# Patient Record
Sex: FEMALE | Race: WHITE | Employment: FULL TIME | URBAN - NONMETROPOLITAN AREA
[De-identification: names, ages, dates, MRNs, and addresses within clinical notes are randomized per-mention and may not be internally consistent; named-entity substitution may affect disease eponyms.]

---

## 2018-01-09 ENCOUNTER — HOSPITAL ENCOUNTER (EMERGENCY)
Age: 18
Discharge: HOME | End: 2018-01-09
Attending: FAMILY MEDICINE
Payer: COMMERCIAL

## 2018-01-09 VITALS
WEIGHT: 135 LBS | BODY MASS INDEX: 23.05 KG/M2 | RESPIRATION RATE: 18 BRPM | DIASTOLIC BLOOD PRESSURE: 76 MMHG | HEART RATE: 107 BPM | HEIGHT: 64 IN | TEMPERATURE: 97.3 F | OXYGEN SATURATION: 98 % | SYSTOLIC BLOOD PRESSURE: 137 MMHG

## 2018-01-09 DIAGNOSIS — R11.2 NAUSEA VOMITING AND DIARRHEA: ICD-10-CM

## 2018-01-09 DIAGNOSIS — R10.84 GENERALIZED ABDOMINAL PAIN: Primary | ICD-10-CM

## 2018-01-09 DIAGNOSIS — R19.7 NAUSEA VOMITING AND DIARRHEA: ICD-10-CM

## 2018-01-09 LAB
BACTERIA: ABNORMAL /HPF
BILIRUBIN URINE: NEGATIVE
BLOOD, URINE: NEGATIVE
CASTS 2: ABNORMAL /LPF
CASTS UA: ABNORMAL /LPF
CHARACTER, URINE: ABNORMAL
COLOR: YELLOW
CRYSTALS, UA: ABNORMAL
EPITHELIAL CELLS, UA: ABNORMAL /HPF
GLUCOSE URINE: NEGATIVE MG/DL
KETONES, URINE: NEGATIVE
LEUKOCYTE ESTERASE, URINE: ABNORMAL
MISCELLANEOUS 2: ABNORMAL
NITRITE, URINE: NEGATIVE
PH UA: 5.5
PREGNANCY, URINE: NEGATIVE
PROTEIN UA: NEGATIVE
RBC URINE: ABNORMAL /HPF
RENAL EPITHELIAL, UA: ABNORMAL
SPECIFIC GRAVITY, URINE: 1.02 (ref 1–1.03)
UROBILINOGEN, URINE: 0.2 EU/DL
WBC UA: ABNORMAL /HPF
YEAST: ABNORMAL

## 2018-01-09 PROCEDURE — 81001 URINALYSIS AUTO W/SCOPE: CPT

## 2018-01-09 PROCEDURE — 6360000002 HC RX W HCPCS: Performed by: FAMILY MEDICINE

## 2018-01-09 PROCEDURE — 99284 EMERGENCY DEPT VISIT MOD MDM: CPT

## 2018-01-09 PROCEDURE — 87086 URINE CULTURE/COLONY COUNT: CPT

## 2018-01-09 PROCEDURE — 81025 URINE PREGNANCY TEST: CPT

## 2018-01-09 PROCEDURE — 96372 THER/PROPH/DIAG INJ SC/IM: CPT

## 2018-01-09 RX ORDER — ONDANSETRON 4 MG/1
4 TABLET, ORALLY DISINTEGRATING ORAL ONCE
Status: COMPLETED | OUTPATIENT
Start: 2018-01-09 | End: 2018-01-09

## 2018-01-09 RX ORDER — ONDANSETRON 4 MG/1
4 TABLET, ORALLY DISINTEGRATING ORAL EVERY 8 HOURS PRN
Qty: 12 TABLET | Refills: 0 | Status: SHIPPED | OUTPATIENT
Start: 2018-01-09 | End: 2018-05-30 | Stop reason: ALTCHOICE

## 2018-01-09 RX ORDER — KETOROLAC TROMETHAMINE 30 MG/ML
60 INJECTION, SOLUTION INTRAMUSCULAR; INTRAVENOUS ONCE
Status: COMPLETED | OUTPATIENT
Start: 2018-01-09 | End: 2018-01-09

## 2018-01-09 RX ADMIN — KETOROLAC TROMETHAMINE 60 MG: 30 INJECTION, SOLUTION INTRAMUSCULAR at 14:05

## 2018-01-09 RX ADMIN — ONDANSETRON 4 MG: 4 TABLET, ORALLY DISINTEGRATING ORAL at 14:05

## 2018-01-09 ASSESSMENT — ENCOUNTER SYMPTOMS
SHORTNESS OF BREATH: 0
ABDOMINAL PAIN: 1
DIARRHEA: 1
VOMITING: 1
SORE THROAT: 0
CHEST TIGHTNESS: 0
WHEEZING: 0
COLOR CHANGE: 0
SINUS PRESSURE: 0
NAUSEA: 1
BACK PAIN: 1

## 2018-01-09 ASSESSMENT — PAIN SCALES - GENERAL: PAINLEVEL_OUTOF10: 8

## 2018-01-09 ASSESSMENT — PAIN DESCRIPTION - FREQUENCY: FREQUENCY: CONTINUOUS

## 2018-01-09 ASSESSMENT — PAIN DESCRIPTION - LOCATION: LOCATION: ABDOMEN;FLANK

## 2018-01-09 ASSESSMENT — PAIN DESCRIPTION - ORIENTATION: ORIENTATION: LEFT;RIGHT

## 2018-01-09 ASSESSMENT — PAIN DESCRIPTION - PAIN TYPE: TYPE: ACUTE PAIN

## 2018-01-09 ASSESSMENT — PAIN DESCRIPTION - DESCRIPTORS: DESCRIPTORS: SHARP

## 2018-01-09 NOTE — LETTER
[address] [facility] [address] [address] EMERGENCY DEPT  [address]  [address] [place] [address] Drive [address]  Phone: [phone]             January 9, 2018    Patient: Veronique Ames   YOB: 2000   Date of Visit: 1/9/2018       To Whom It May Concern:    Veronique Ames was seen and treated in our emergency department on 1/9/2018. She may return to school on 1/10/18.       Sincerely,   Lizzei Granda RN            Signature:__________________________________

## 2018-01-11 LAB — URINE CULTURE REFLEX: NORMAL

## 2018-04-13 ENCOUNTER — NURSE TRIAGE (OUTPATIENT)
Dept: ADMINISTRATIVE | Age: 18
End: 2018-04-13

## 2018-05-30 ENCOUNTER — HOSPITAL ENCOUNTER (EMERGENCY)
Age: 18
Discharge: HOME | End: 2018-05-30
Payer: COMMERCIAL

## 2018-05-30 VITALS
BODY MASS INDEX: 23.9 KG/M2 | WEIGHT: 140 LBS | SYSTOLIC BLOOD PRESSURE: 129 MMHG | RESPIRATION RATE: 18 BRPM | DIASTOLIC BLOOD PRESSURE: 73 MMHG | TEMPERATURE: 98.9 F | HEART RATE: 100 BPM | HEIGHT: 64 IN | OXYGEN SATURATION: 98 %

## 2018-05-30 DIAGNOSIS — J00 ACUTE NASOPHARYNGITIS: Primary | ICD-10-CM

## 2018-05-30 PROCEDURE — 99202 OFFICE O/P NEW SF 15 MIN: CPT | Performed by: NURSE PRACTITIONER

## 2018-05-30 PROCEDURE — 99214 OFFICE O/P EST MOD 30 MIN: CPT

## 2018-05-30 RX ORDER — PSEUDOEPHEDRINE HYDROCHLORIDE 30 MG/1
30 TABLET ORAL EVERY 6 HOURS PRN
Qty: 20 TABLET | Refills: 1 | Status: SHIPPED | OUTPATIENT
Start: 2018-05-30 | End: 2018-06-06

## 2018-05-30 RX ORDER — ALBUTEROL SULFATE 90 UG/1
2 AEROSOL, METERED RESPIRATORY (INHALATION) 2 TIMES DAILY
Qty: 1 INHALER | Refills: 0 | Status: SHIPPED | OUTPATIENT
Start: 2018-05-30 | End: 2018-06-17

## 2018-05-30 RX ORDER — PREDNISONE 1 MG/1
5 TABLET ORAL 2 TIMES DAILY
Qty: 10 TABLET | Refills: 0 | Status: SHIPPED | OUTPATIENT
Start: 2018-05-30 | End: 2018-06-04

## 2018-05-30 RX ORDER — AZELASTINE 1 MG/ML
1 SPRAY, METERED NASAL 2 TIMES DAILY
Qty: 1 BOTTLE | Refills: 0 | Status: SHIPPED | OUTPATIENT
Start: 2018-05-30 | End: 2018-06-17

## 2018-05-30 ASSESSMENT — PAIN DESCRIPTION - FREQUENCY: FREQUENCY: CONTINUOUS

## 2018-05-30 ASSESSMENT — ENCOUNTER SYMPTOMS
CHEST TIGHTNESS: 0
RHINORRHEA: 1
VOMITING: 0
APNEA: 0
NAUSEA: 1
SORE THROAT: 1
ABDOMINAL PAIN: 0
SWOLLEN GLANDS: 0
STRIDOR: 0
DIARRHEA: 0
COUGH: 1
HOARSE VOICE: 0
SINUS PRESSURE: 1
SNORING: 0
CHOKING: 0
SHORTNESS OF BREATH: 0
WHEEZING: 0

## 2018-05-30 ASSESSMENT — PAIN DESCRIPTION - PAIN TYPE: TYPE: ACUTE PAIN

## 2018-05-30 ASSESSMENT — PAIN DESCRIPTION - LOCATION: LOCATION: HEAD;EAR

## 2018-05-30 ASSESSMENT — PAIN SCALES - GENERAL: PAINLEVEL_OUTOF10: 4

## 2018-05-30 ASSESSMENT — PAIN DESCRIPTION - DESCRIPTORS: DESCRIPTORS: PRESSURE

## 2018-06-17 ENCOUNTER — HOSPITAL ENCOUNTER (EMERGENCY)
Age: 18
Discharge: HOME | End: 2018-06-17
Payer: COMMERCIAL

## 2018-06-17 VITALS
DIASTOLIC BLOOD PRESSURE: 76 MMHG | SYSTOLIC BLOOD PRESSURE: 128 MMHG | HEIGHT: 64 IN | TEMPERATURE: 98.1 F | RESPIRATION RATE: 18 BRPM | OXYGEN SATURATION: 97 % | WEIGHT: 140 LBS | BODY MASS INDEX: 23.9 KG/M2 | HEART RATE: 118 BPM

## 2018-06-17 DIAGNOSIS — J06.9 ACUTE UPPER RESPIRATORY INFECTION: Primary | ICD-10-CM

## 2018-06-17 PROCEDURE — 99213 OFFICE O/P EST LOW 20 MIN: CPT | Performed by: NURSE PRACTITIONER

## 2018-06-17 PROCEDURE — 99212 OFFICE O/P EST SF 10 MIN: CPT

## 2018-06-17 RX ORDER — AZITHROMYCIN 250 MG/1
TABLET, FILM COATED ORAL
Qty: 6 TABLET | Refills: 0 | Status: SHIPPED | OUTPATIENT
Start: 2018-06-17 | End: 2018-06-17

## 2018-06-17 RX ORDER — PREDNISONE 20 MG/1
40 TABLET ORAL DAILY
Qty: 10 TABLET | Refills: 0 | Status: SHIPPED | OUTPATIENT
Start: 2018-06-17 | End: 2018-06-22

## 2018-06-17 RX ORDER — AZITHROMYCIN 250 MG/1
TABLET, FILM COATED ORAL
Qty: 6 TABLET | Refills: 0 | Status: SHIPPED | OUTPATIENT
Start: 2018-06-17 | End: 2018-12-06 | Stop reason: ALTCHOICE

## 2018-06-17 ASSESSMENT — ENCOUNTER SYMPTOMS
WHEEZING: 1
COUGH: 1
SHORTNESS OF BREATH: 0
SORE THROAT: 1
COLOR CHANGE: 0
TROUBLE SWALLOWING: 0
DIARRHEA: 0
RHINORRHEA: 0
SINUS PAIN: 0
NAUSEA: 0
SINUS PRESSURE: 1
CHEST TIGHTNESS: 1
EYE ITCHING: 0
EYE DISCHARGE: 0
VOMITING: 0

## 2018-06-18 ENCOUNTER — NURSE TRIAGE (OUTPATIENT)
Dept: ADMINISTRATIVE | Age: 18
End: 2018-06-18

## 2018-12-06 ENCOUNTER — HOSPITAL ENCOUNTER (EMERGENCY)
Age: 18
Discharge: HOME | End: 2018-12-06
Attending: EMERGENCY MEDICINE

## 2018-12-06 VITALS
HEIGHT: 64 IN | SYSTOLIC BLOOD PRESSURE: 110 MMHG | DIASTOLIC BLOOD PRESSURE: 63 MMHG | WEIGHT: 140 LBS | RESPIRATION RATE: 16 BRPM | HEART RATE: 88 BPM | OXYGEN SATURATION: 96 % | TEMPERATURE: 98.6 F | BODY MASS INDEX: 23.9 KG/M2

## 2018-12-06 DIAGNOSIS — Z20.2 POSSIBLE EXPOSURE TO STD: ICD-10-CM

## 2018-12-06 DIAGNOSIS — N30.01 ACUTE CYSTITIS WITH HEMATURIA: Primary | ICD-10-CM

## 2018-12-06 LAB
BILIRUBIN URINE: NEGATIVE
BLOOD, URINE: ABNORMAL
CHARACTER, URINE: ABNORMAL
CHLAMYDIA TRACHOMATIS BY RT-PCR: NOT DETECTED
COLOR: YELLOW
CT/NG SOURCE: NORMAL
GLUCOSE, URINE: NEGATIVE MG/DL
KETONES, URINE: NEGATIVE
LEUKOCYTES, UA: ABNORMAL
NEISSERIA GONORRHOEAE BY RT-PCR: NOT DETECTED
NITRATE, UA: NEGATIVE
PH UA: 7.5 (ref 5–9)
PROTEIN UA: ABNORMAL MG/DL
REFLEX TO URINE C & S: ABNORMAL
SPECIFIC GRAVITY UA: 1.02 (ref 1–1.03)
UROBILINOGEN, URINE: 0.2 EU/DL (ref 0–1)

## 2018-12-06 PROCEDURE — 99213 OFFICE O/P EST LOW 20 MIN: CPT

## 2018-12-06 PROCEDURE — 99213 OFFICE O/P EST LOW 20 MIN: CPT | Performed by: EMERGENCY MEDICINE

## 2018-12-06 PROCEDURE — 87491 CHLMYD TRACH DNA AMP PROBE: CPT

## 2018-12-06 PROCEDURE — 87591 N.GONORRHOEAE DNA AMP PROB: CPT

## 2018-12-06 PROCEDURE — 81003 URINALYSIS AUTO W/O SCOPE: CPT

## 2018-12-06 RX ORDER — DOXYCYCLINE HYCLATE 100 MG
100 TABLET ORAL 2 TIMES DAILY
Qty: 14 TABLET | Refills: 0 | Status: SHIPPED | OUTPATIENT
Start: 2018-12-06 | End: 2018-12-13

## 2018-12-06 RX ORDER — PHENAZOPYRIDINE HYDROCHLORIDE 200 MG/1
200 TABLET, FILM COATED ORAL 3 TIMES DAILY PRN
Qty: 9 TABLET | Refills: 0 | Status: SHIPPED | OUTPATIENT
Start: 2018-12-06 | End: 2019-01-24 | Stop reason: ALTCHOICE

## 2018-12-06 ASSESSMENT — ENCOUNTER SYMPTOMS
TROUBLE SWALLOWING: 0
CHOKING: 0
NAUSEA: 0
SHORTNESS OF BREATH: 0
CONSTIPATION: 0
WHEEZING: 0
VOMITING: 0
DIARRHEA: 0
SORE THROAT: 0
SINUS PRESSURE: 0
BACK PAIN: 0
COUGH: 0
EYE REDNESS: 0
FACIAL SWELLING: 0
EYE DISCHARGE: 0
VOICE CHANGE: 0
BLOOD IN STOOL: 0
STRIDOR: 0
ABDOMINAL PAIN: 0
EYE PAIN: 0

## 2018-12-06 NOTE — ED PROVIDER NOTES
heard.  Pulmonary/Chest: Effort normal and breath sounds normal. No stridor. No respiratory distress. She has no wheezes. She has no rales. She exhibits no tenderness. No cough, lungs clear   Abdominal: Soft. Bowel sounds are normal. She exhibits no distension and no mass. There is no tenderness. There is no rebound and no guarding. Soft nontender   Musculoskeletal: Normal range of motion. She exhibits no edema or tenderness. Right lower leg: Normal.        Left lower leg: Normal.   Lymphadenopathy:     She has cervical adenopathy. Right cervical: Superficial cervical adenopathy present. No deep cervical and no posterior cervical adenopathy present. Left cervical: Superficial cervical adenopathy present. No deep cervical and no posterior cervical adenopathy present. Neurological: She is alert and oriented to person, place, and time. She has normal reflexes. She displays normal reflexes. No cranial nerve deficit. She exhibits normal muscle tone. Coordination normal.   Appropriate, no focal findings   Skin: Skin is warm and dry. No rash noted. She is not diaphoretic. No erythema. No rash   Psychiatric: She has a normal mood and affect. Her behavior is normal. Judgment and thought content normal.   Nursing note and vitals reviewed.       DIAGNOSTIC RESULTS   Labs:  Results for orders placed or performed during the hospital encounter of 12/06/18   UA without Microscopic Reflex C&S   Result Value Ref Range    Glucose, Urine Negative NEGATIVE mg/dl    Bilirubin Urine Negative NEGATIVE    Ketones, Urine Negative NEGATIVE    Specific Gravity, UA 1.020 1.002 - 1.03    Blood, Urine Moderate (A) NEGATIVE    pH, UA 7.50 5.0 - 9.0    Protein, UA Trace (A) NEGATIVE mg/dl    Urobilinogen, Urine 0.20 0.0 - 1.0 eu/dl    Nitrate, UA Negative NEGATIVE    LEUKOCYTES, UA Trace (A) NEGATIVE    Color, UA Yellow STRAW-YELL    Character, Urine Slightly Cloudy CLEAR-SL C    REFLEX TO URINE C & S NOT INDICATED

## 2019-01-24 ENCOUNTER — HOSPITAL ENCOUNTER (EMERGENCY)
Age: 19
Discharge: HOME | End: 2019-01-24
Payer: COMMERCIAL

## 2019-01-24 VITALS
BODY MASS INDEX: 23.32 KG/M2 | RESPIRATION RATE: 18 BRPM | TEMPERATURE: 98.6 F | SYSTOLIC BLOOD PRESSURE: 121 MMHG | OXYGEN SATURATION: 96 % | HEIGHT: 65 IN | HEART RATE: 106 BPM | WEIGHT: 140 LBS | DIASTOLIC BLOOD PRESSURE: 61 MMHG

## 2019-01-24 DIAGNOSIS — J06.9 VIRAL URI WITH COUGH: Primary | ICD-10-CM

## 2019-01-24 PROCEDURE — 99213 OFFICE O/P EST LOW 20 MIN: CPT | Performed by: NURSE PRACTITIONER

## 2019-01-24 PROCEDURE — 99215 OFFICE O/P EST HI 40 MIN: CPT

## 2019-01-24 RX ORDER — BENZONATATE 100 MG/1
100 CAPSULE ORAL 3 TIMES DAILY PRN
Qty: 21 CAPSULE | Refills: 0 | Status: SHIPPED | OUTPATIENT
Start: 2019-01-24 | End: 2019-01-31

## 2019-01-24 RX ORDER — PREDNISONE 20 MG/1
40 TABLET ORAL DAILY
Qty: 14 TABLET | Refills: 0 | Status: SHIPPED | OUTPATIENT
Start: 2019-01-24 | End: 2019-01-31

## 2019-01-24 ASSESSMENT — ENCOUNTER SYMPTOMS
COUGH: 1
CHEST TIGHTNESS: 0
NAUSEA: 0
SHORTNESS OF BREATH: 0
WHEEZING: 0
SINUS PRESSURE: 0
ABDOMINAL PAIN: 0
VOMITING: 0
DIARRHEA: 0

## 2019-04-23 ENCOUNTER — HOSPITAL ENCOUNTER (OUTPATIENT)
Age: 19
Discharge: HOME | End: 2019-04-23

## 2019-04-26 LAB — VZV IGG SER QL IA: 2.49

## 2019-07-11 ENCOUNTER — HOSPITAL ENCOUNTER (EMERGENCY)
Age: 19
Discharge: LEFT BEFORE DISCHARGE | End: 2019-07-11
Attending: INTERNAL MEDICINE
Payer: COMMERCIAL

## 2019-07-11 VITALS
SYSTOLIC BLOOD PRESSURE: 102 MMHG | HEIGHT: 64 IN | RESPIRATION RATE: 18 BRPM | DIASTOLIC BLOOD PRESSURE: 62 MMHG | TEMPERATURE: 98.6 F | WEIGHT: 160 LBS | BODY MASS INDEX: 27.31 KG/M2 | OXYGEN SATURATION: 98 % | HEART RATE: 98 BPM

## 2019-07-11 DIAGNOSIS — R11.0 NAUSEA: ICD-10-CM

## 2019-07-11 DIAGNOSIS — R03.0 ELEVATED BLOOD PRESSURE READING: ICD-10-CM

## 2019-07-11 DIAGNOSIS — Z3A.24 24 WEEKS GESTATION OF PREGNANCY: ICD-10-CM
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
## 2019-07-11 LAB
ALBUMIN SERPL-MCNC: 3.6 G/DL (ref 3.5–5.1)
ALP BLD-CCNC: 60 U/L (ref 38–126)
ALT SERPL-CCNC: 14 U/L (ref 11–66)
AMPHETAMINE+METHAMPHETAMINE URINE SCREEN: NEGATIVE
ANION GAP SERPL CALCULATED.3IONS-SCNC: 12 MEQ/L (ref 8–16)
AST SERPL-CCNC: 18 U/L (ref 5–40)
BACTERIA: NORMAL /HPF
BARBITURATE QUANTITATIVE URINE: NEGATIVE
BASOPHILS # BLD: 0.1 %
BASOPHILS ABSOLUTE: 0 THOU/MM3 (ref 0–0.1)
BENZODIAZEPINE QUANTITATIVE URINE: NEGATIVE
BILIRUB SERPL-MCNC: < 0.2 MG/DL (ref 0.3–1.2)
BILIRUBIN URINE: NEGATIVE
BLOOD, URINE: NEGATIVE
BUN BLDV-MCNC: 11 MG/DL (ref 7–22)
CALCIUM SERPL-MCNC: 9 MG/DL (ref 8.5–10.5)
CANNABINOID QUANTITATIVE URINE: NEGATIVE
CASTS 2: NORMAL /LPF
CASTS UA: NORMAL /LPF
CHARACTER, URINE: NORMAL
CHLORIDE BLD-SCNC: 106 MEQ/L (ref 98–111)
CO2: 19 MEQ/L (ref 23–33)
COCAINE METABOLITE QUANTITATIVE URINE: NEGATIVE
COLOR: YELLOW
CREAT SERPL-MCNC: 0.5 MG/DL (ref 0.4–1.2)
CRYSTALS, UA: NORMAL
EOSINOPHIL # BLD: 1 %
EOSINOPHILS ABSOLUTE: 0.1 THOU/MM3 (ref 0–0.4)
EPITHELIAL CELLS, UA: NORMAL /HPF
ERYTHROCYTE [DISTWIDTH] IN BLOOD BY AUTOMATED COUNT: 12.8 % (ref 11.5–14.5)
ERYTHROCYTE [DISTWIDTH] IN BLOOD BY AUTOMATED COUNT: 42.1 FL (ref 35–45)
GFR SERPL CREATININE-BSD FRML MDRD: > 90 ML/MIN/1.73M2
GLUCOSE BLD-MCNC: 96 MG/DL (ref 70–108)
GLUCOSE URINE: NEGATIVE MG/DL
HCT VFR BLD CALC: 36.3 % (ref 37–47)
HEMOGLOBIN: 12.6 GM/DL (ref 12–16)
IMMATURE GRANS (ABS): 0.19 THOU/MM3 (ref 0–0.07)
IMMATURE GRANULOCYTES: 1.4 %
KETONES, URINE: NEGATIVE
LEUKOCYTE ESTERASE, URINE: NEGATIVE
LYMPHOCYTES # BLD: 13.6 %
LYMPHOCYTES ABSOLUTE: 1.8 THOU/MM3 (ref 1–4.8)
MAGNESIUM: 1.9 MG/DL (ref 1.6–2.4)
MCH RBC QN AUTO: 31.6 PG (ref 26–33)
MCHC RBC AUTO-ENTMCNC: 34.7 GM/DL (ref 32.2–35.5)
MCV RBC AUTO: 91 FL (ref 81–99)
MISCELLANEOUS 2: NORMAL
MONOCYTES # BLD: 6.8 %
MONOCYTES ABSOLUTE: 0.9 THOU/MM3 (ref 0.4–1.3)
NITRITE, URINE: NEGATIVE
NUCLEATED RED BLOOD CELLS: 0 /100 WBC
OPIATES, URINE: NEGATIVE
OSMOLALITY CALCULATION: 273.1 MOSMOL/KG (ref 275–300)
OXYCODONE: NEGATIVE
PH UA: 7 (ref 5–9)
PHENCYCLIDINE QUANTITATIVE URINE: NEGATIVE
PLATELET # BLD: 230 THOU/MM3 (ref 130–400)
PMV BLD AUTO: 10.2 FL (ref 9.4–12.4)
POTASSIUM SERPL-SCNC: 4 MEQ/L (ref 3.5–5.2)
PREGNANCY, SERUM: POSITIVE
PROTEIN UA: NEGATIVE
RBC # BLD: 3.99 MILL/MM3 (ref 4.2–5.4)
RBC URINE: NORMAL /HPF
RENAL EPITHELIAL, UA: NORMAL
SEDIMENTATION RATE, ERYTHROCYTE: 17 MM/HR (ref 0–20)
SEG NEUTROPHILS: 77.1 %
SEGMENTED NEUTROPHILS ABSOLUTE COUNT: 10.3 THOU/MM3 (ref 1.8–7.7)
SODIUM BLD-SCNC: 137 MEQ/L (ref 135–145)
SPECIFIC GRAVITY, URINE: 1.02 (ref 1–1.03)
TOTAL PROTEIN: 6.5 G/DL (ref 6.1–8)
UROBILINOGEN, URINE: 0.2 EU/DL (ref 0–1)
WBC # BLD: 13.4 THOU/MM3 (ref 4.8–10.8)
WBC UA: NORMAL /HPF
YEAST: NORMAL

## 2019-07-11 PROCEDURE — 80053 COMPREHEN METABOLIC PANEL: CPT

## 2019-07-11 PROCEDURE — 81001 URINALYSIS AUTO W/SCOPE: CPT

## 2019-07-11 PROCEDURE — 83735 ASSAY OF MAGNESIUM: CPT

## 2019-07-11 PROCEDURE — 99284 EMERGENCY DEPT VISIT MOD MDM: CPT

## 2019-07-11 PROCEDURE — 85460 HEMOGLOBIN FETAL: CPT

## 2019-07-11 PROCEDURE — 85025 COMPLETE CBC W/AUTO DIFF WBC: CPT

## 2019-07-11 PROCEDURE — 80307 DRUG TEST PRSMV CHEM ANLYZR: CPT

## 2019-07-11 PROCEDURE — 59025 FETAL NON-STRESS TEST: CPT

## 2019-07-11 PROCEDURE — 36415 COLL VENOUS BLD VENIPUNCTURE: CPT

## 2019-07-11 PROCEDURE — 84703 CHORIONIC GONADOTROPIN ASSAY: CPT

## 2019-07-11 PROCEDURE — 85651 RBC SED RATE NONAUTOMATED: CPT

## 2019-07-11 PROCEDURE — 99215 OFFICE O/P EST HI 40 MIN: CPT

## 2019-07-11 ASSESSMENT — ENCOUNTER SYMPTOMS
SORE THROAT: 0
SHORTNESS OF BREATH: 0
BACK PAIN: 1
COUGH: 0
NAUSEA: 1
DIARRHEA: 0
VOMITING: 0
ABDOMINAL PAIN: 0
EYE PAIN: 0
EYE DISCHARGE: 0
WHEEZING: 0
RHINORRHEA: 0
NAUSEA: 0

## 2019-07-11 ASSESSMENT — PAIN DESCRIPTION - PROGRESSION: CLINICAL_PROGRESSION: NOT CHANGED

## 2019-07-11 ASSESSMENT — PAIN DESCRIPTION - LOCATION
LOCATION: HEAD
LOCATION: HEAD

## 2019-07-11 ASSESSMENT — PAIN - FUNCTIONAL ASSESSMENT: PAIN_FUNCTIONAL_ASSESSMENT: ACTIVITIES ARE NOT PREVENTED

## 2019-07-11 ASSESSMENT — PAIN DESCRIPTION - FREQUENCY: FREQUENCY: CONTINUOUS

## 2019-07-11 ASSESSMENT — PAIN SCALES - GENERAL
PAINLEVEL_OUTOF10: 3
PAINLEVEL_OUTOF10: 5
PAINLEVEL_OUTOF10: 4

## 2019-07-11 ASSESSMENT — PAIN DESCRIPTION - DESCRIPTORS: DESCRIPTORS: HEADACHE

## 2019-07-11 ASSESSMENT — PAIN DESCRIPTION - PAIN TYPE
TYPE: ACUTE PAIN

## 2019-07-11 ASSESSMENT — PAIN DESCRIPTION - ONSET: ONSET: SUDDEN
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
## 2019-07-11 NOTE — ED NOTES
Dr Guilherme Eugene into room. Pt not in room. Attempted to call Pt cell phone number on file, no answer. In conclusion pt eloped.       Clarice Gifford RN  07/11/19 1440
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
## 2019-07-20 ENCOUNTER — HOSPITAL ENCOUNTER (EMERGENCY)
Age: 19
Discharge: HOME | End: 2019-07-20
Attending: EMERGENCY MEDICINE
Payer: COMMERCIAL

## 2019-07-20 VITALS
TEMPERATURE: 98.4 F | SYSTOLIC BLOOD PRESSURE: 113 MMHG | RESPIRATION RATE: 16 BRPM | HEART RATE: 86 BPM | DIASTOLIC BLOOD PRESSURE: 58 MMHG | OXYGEN SATURATION: 97 % | WEIGHT: 160 LBS | HEIGHT: 64 IN | BODY MASS INDEX: 27.31 KG/M2

## 2019-07-20 DIAGNOSIS — L30.1 DYSHIDROTIC ECZEMA: Primary | ICD-10-CM

## 2019-07-20 DIAGNOSIS — Z3A.24 PREGNANCY WITH 24 COMPLETED WEEKS GESTATION: ICD-10-CM

## 2019-07-20 PROCEDURE — 99213 OFFICE O/P EST LOW 20 MIN: CPT | Performed by: EMERGENCY MEDICINE

## 2019-07-20 PROCEDURE — 99212 OFFICE O/P EST SF 10 MIN: CPT

## 2019-07-20 ASSESSMENT — ENCOUNTER SYMPTOMS
EYE PAIN: 0
BLOOD IN STOOL: 0
VOICE CHANGE: 0
EYE DISCHARGE: 0
CONSTIPATION: 0
VOMITING: 0
COUGH: 0
SHORTNESS OF BREATH: 0
CHOKING: 0
STRIDOR: 0
SORE THROAT: 0
FACIAL SWELLING: 0
DIARRHEA: 0
EYE REDNESS: 0
ABDOMINAL PAIN: 0
WHEEZING: 0
SINUS PRESSURE: 0
NAUSEA: 0
TROUBLE SWALLOWING: 0
BACK PAIN: 0

## 2019-07-20 NOTE — ED PROVIDER NOTES
[phone]       Chief Complaint   Patient presents with    Rash     feet and inbetween fingers . itchy- feet are worse       Nurses Notes reviewed and I agree except as noted in the HPI. HISTORY OF PRESENT Ramona Rodríguez is a 19 y.o. female who presents with 5-day history of itchy rash on the feet and hands. 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 Currently 24 weeks pregnant. No chest pain, shortness of breath, stridor, wheezing, abdominal pain, vomiting, painful or swollen glands, dizziness, syncope. Non-smoker. No history of diabetes or asthma. REVIEW OF SYSTEMS     Review of Systems   Constitutional: Negative for appetite change, chills, fatigue, fever and unexpected weight change. HENT: Negative for congestion, ear discharge, ear pain, facial swelling, hearing loss, nosebleeds, postnasal drip, sinus pressure, sore throat, trouble swallowing and voice change. Eyes: Negative for pain, discharge, redness and visual disturbance. Respiratory: Negative for cough, choking, shortness of breath, wheezing and stridor. Cardiovascular: Negative for chest pain and leg swelling. Gastrointestinal: Negative for abdominal pain, blood in stool, constipation, diarrhea, nausea and vomiting. Genitourinary: Negative for dysuria, flank pain, frequency, hematuria, urgency, vaginal bleeding and vaginal discharge. Musculoskeletal: Negative for arthralgias, back pain, neck pain and neck stiffness. Skin: Positive for rash. Itchy rash hands and feet   Neurological: Negative for dizziness, seizures, syncope, weakness, light-headedness and headaches. Hematological: Negative for adenopathy. Does not bruise/bleed easily. Psychiatric/Behavioral: Negative for confusion, sleep disturbance and suicidal ideas. The patient is not nervous/anxious. All other systems reviewed and are negative.       PAST MEDICAL HISTORY

## 2019-07-20 NOTE — LETTER
[facility] [facility]  [address] [place] [address]  Phone: [phone]               July 20, 2019    Patient: Darrel Bass   YOB: 2000   Date of Visit: 7/20/2019       To Whom It May Concern:    Darrel Bass was seen and treated in our emergency department on 7/20/2019. She may return to work 7/22/19.       Sincerely,       Lety Weston RN, BSN        Signature:__________________________________

## 2019-08-12 ENCOUNTER — HOSPITAL ENCOUNTER (OUTPATIENT)
Dept: NURSING | Age: 19
Discharge: HOME | End: 2019-08-12

## 2019-08-12 VITALS
HEART RATE: 100 BPM | TEMPERATURE: 96.9 F | DIASTOLIC BLOOD PRESSURE: 68 MMHG | SYSTOLIC BLOOD PRESSURE: 114 MMHG | BODY MASS INDEX: 28.32 KG/M2 | RESPIRATION RATE: 18 BRPM | OXYGEN SATURATION: 100 % | WEIGHT: 165 LBS

## 2019-08-12 LAB
ANTIBODY SCREEN: NORMAL
GESTATIONAL AGE(WEEKS): NORMAL

## 2019-08-12 PROCEDURE — 96372 THER/PROPH/DIAG INJ SC/IM: CPT

## 2019-08-12 PROCEDURE — 36415 COLL VENOUS BLD VENIPUNCTURE: CPT

## 2019-08-12 PROCEDURE — 86850 RBC ANTIBODY SCREEN: CPT

## 2019-08-12 PROCEDURE — 6360000002 HC RX W HCPCS: Performed by: OBSTETRICS & GYNECOLOGY

## 2019-08-12 RX ADMIN — HUMAN RHO(D) IMMUNE GLOBULIN 300 MCG: 300 INJECTION, SOLUTION INTRAMUSCULAR at 11:43

## 2019-08-12 NOTE — PROGRESS NOTES
[address] OPN FOR RHOGAM INJECTION. ALL QUESTIONS AND CONCERNS ADDRESSED. 28 WEEKS GESTATION CONFIRMED  1113 PATIENT RIGHTS AND RESPONSIBILITIES SHEET OFFERED TO PT TO READ.   200 __M__ Safety:       (Environmental)   Warden to environment   Ensure ID band is correct and in place/ allergy band as needed   Assess for fall risk   Initiate fall precautions as applicable (fall band, side rails, etc.)   Call light within reach   Bed in low position/ wheels locked    _M___ Pain:        Assess pain level and characteristics   Administer analgesics as ordered   Assess effectiveness of pain management and report to MD as needed    _M___ Knowledge Deficit:   Assess baseline knowledge   Provide teaching at level of understanding   Provide teaching via preferred learning method   Evaluate teaching effectiveness    __M__ Hemodynamic/Respiratory Status:       (Pre and Post Procedure Monitoring)   Assess/Monitor vital signs and LOC   Assess Baseline SpO2 prior to any sedation   Obtain weight/height   Assess vital signs/ LOC until patient meets discharge criteria   Monitor procedure site and notify MD of any issues       1126 LAB AT BEDSIDE  1130 WRITTEN DISCHARGE INSTRUCTIONS GIVEN TO PT-VERBALIZES UNDERSTANDING  1150 PT DISCHARGED AMBULATORY IN SATISFACTORY CONDITION

## 2019-09-20 ENCOUNTER — HOSPITAL ENCOUNTER (EMERGENCY)
Age: 19
Discharge: HOME | End: 2019-09-20
Payer: COMMERCIAL

## 2019-09-20 VITALS
DIASTOLIC BLOOD PRESSURE: 58 MMHG | OXYGEN SATURATION: 97 % | SYSTOLIC BLOOD PRESSURE: 115 MMHG | HEIGHT: 64 IN | WEIGHT: 192 LBS | TEMPERATURE: 97.1 F | RESPIRATION RATE: 16 BRPM | BODY MASS INDEX: 32.78 KG/M2 | HEART RATE: 129 BPM

## 2019-09-20 DIAGNOSIS — R05.9 COUGH: Primary | ICD-10-CM

## 2019-09-20 DIAGNOSIS — J06.9 ACUTE UPPER RESPIRATORY INFECTION: ICD-10-CM

## 2019-09-20 PROCEDURE — 99213 OFFICE O/P EST LOW 20 MIN: CPT | Performed by: NURSE PRACTITIONER

## 2019-09-20 PROCEDURE — 99212 OFFICE O/P EST SF 10 MIN: CPT

## 2019-09-20 RX ORDER — AMOXICILLIN 500 MG/1
500 CAPSULE ORAL 3 TIMES DAILY
Qty: 30 CAPSULE | Refills: 0 | Status: SHIPPED | OUTPATIENT
Start: 2019-09-20 | End: 2019-09-30

## 2019-09-20 ASSESSMENT — PAIN DESCRIPTION - PAIN TYPE: TYPE: ACUTE PAIN

## 2019-09-20 ASSESSMENT — PAIN SCALES - GENERAL: PAINLEVEL_OUTOF10: 6

## 2019-09-20 ASSESSMENT — ENCOUNTER SYMPTOMS
WHEEZING: 0
VOMITING: 0
EYE DISCHARGE: 0
EYE ITCHING: 0
EYE PAIN: 0
BACK PAIN: 0
SORE THROAT: 0
EYE REDNESS: 0
COUGH: 1
NAUSEA: 0
FACIAL SWELLING: 0
DIARRHEA: 0
SINUS PAIN: 1
SHORTNESS OF BREATH: 0
CHEST TIGHTNESS: 0
STRIDOR: 0
SINUS PRESSURE: 1

## 2019-09-20 ASSESSMENT — PAIN DESCRIPTION - DESCRIPTORS: DESCRIPTORS: ACHING;THROBBING

## 2019-09-20 ASSESSMENT — PAIN DESCRIPTION - FREQUENCY: FREQUENCY: CONTINUOUS

## 2019-09-20 ASSESSMENT — PAIN DESCRIPTION - ORIENTATION: ORIENTATION: ANTERIOR

## 2019-09-20 ASSESSMENT — PAIN DESCRIPTION - LOCATION: LOCATION: FACE

## 2019-09-20 ASSESSMENT — PAIN - FUNCTIONAL ASSESSMENT: PAIN_FUNCTIONAL_ASSESSMENT: PREVENTS OR INTERFERES SOME ACTIVE ACTIVITIES AND ADLS

## 2019-09-20 NOTE — ED PROVIDER NOTES
DIAGNOSTIC RESULTS     Labs:No results found for this visit on 19. IMAGING:    No orders to display     URGENT CARE COURSE:     Vitals:    19 1219   BP: (!) 115/58   Pulse: 129   Resp: 16   Temp: 97.1 °F (36.2 °C)   TempSrc: Temporal   SpO2: 97%   Weight: 192 lb (87.1 kg)   Height: 5' 4\" (1.626 m)       Medications - No data to display         PROCEDURES:  None    FINAL IMPRESSION      1. Cough    2. Acute upper respiratory infection          DISPOSITION/ PLAN   She is discharged home with prescription for amoxicillin that she may begin taking today for upper respiratory infection. Discussed with patient that sections can lead to  labor, given that she is also tried over-the-counter remedies with no relief we will treat with antibiotic. Patient may also continue use of any Tylenol or Mucinex over-the-counter. Discussed with patient that she should follow-up with her OB/GYN if symptoms do not seem to be improving within a week, patient was also given contact resources to establish with a primary care provider as well. PATIENT REFERRED TO:  No primary care provider on file. No primary physician on file.       DISCHARGE MEDICATIONS:  Discharge Medication List as of 2019 12:34 PM      START taking these medications    Details   amoxicillin (AMOXIL) 500 MG capsule Take 1 capsule by mouth 3 times daily for 10 days, Disp-30 capsule, R-0Print             Discharge Medication List as of 2019 12:34 PM          Discharge Medication List as of 2019 12:34 PM          MATTHIAS Garcia NP    (Please note that portions of this note were completed with a voice recognition program. Efforts were made to edit the dictations but occasionally words are mis-transcribed.)         Luciana Bloch, APRN - NP  [phone]

## 2019-09-25 ENCOUNTER — OFFICE VISIT (OUTPATIENT)
Dept: FAMILY MEDICINE CLINIC | Age: 19
End: 2019-09-25
Payer: COMMERCIAL

## 2019-09-25 VITALS
BODY MASS INDEX: 33.05 KG/M2 | HEIGHT: 64 IN | DIASTOLIC BLOOD PRESSURE: 70 MMHG | WEIGHT: 193.6 LBS | HEART RATE: 94 BPM | TEMPERATURE: 97.5 F | SYSTOLIC BLOOD PRESSURE: 116 MMHG | RESPIRATION RATE: 14 BRPM

## 2019-09-25 DIAGNOSIS — Z3A.34 PREGNANCY WITH 34 COMPLETED WEEKS GESTATION: ICD-10-CM

## 2019-09-25 DIAGNOSIS — Z23 NEED FOR IMMUNIZATION AGAINST INFLUENZA: ICD-10-CM

## 2019-09-25 DIAGNOSIS — O21.9 NAUSEA AND VOMITING IN PREGNANCY: Primary | ICD-10-CM

## 2019-09-25 DIAGNOSIS — L91.0 KELOID SCAR: ICD-10-CM

## 2019-09-25 PROCEDURE — 90688 IIV4 VACCINE SPLT 0.5 ML IM: CPT | Performed by: NURSE PRACTITIONER

## 2019-09-25 PROCEDURE — 90471 IMMUNIZATION ADMIN: CPT | Performed by: NURSE PRACTITIONER

## 2019-09-25 PROCEDURE — 99202 OFFICE O/P NEW SF 15 MIN: CPT | Performed by: NURSE PRACTITIONER

## 2019-09-25 ASSESSMENT — PATIENT HEALTH QUESTIONNAIRE - PHQ9
SUM OF ALL RESPONSES TO PHQ QUESTIONS 1-9: 0
SUM OF ALL RESPONSES TO PHQ QUESTIONS 1-9: 0
SUM OF ALL RESPONSES TO PHQ9 QUESTIONS 1 & 2: 0
2. FEELING DOWN, DEPRESSED OR HOPELESS: 0
1. LITTLE INTEREST OR PLEASURE IN DOING THINGS: 0

## 2019-09-25 NOTE — PATIENT INSTRUCTIONS
Patient Education     Speak with [employer] [facility] advisors about paperwork to show requirements to be excused from school due to a medical condition, then take that to OB/GYN to see what other help they can offer  Sleep more upright  Eat small, frequent, bland meals  Return for wellness after baby is born and will have Dr Haley Ford look at scar on your back     Managing Morning Sickness: Care Instructions  Your Care Instructions    For many women, the toughest part of early pregnancy is morning sickness. Morning sickness can range from mild nausea to severe nausea with bouts of vomiting. Symptoms may be worse in the morning, although they can strike at any time of the day or night. If you have nausea, vomiting, or both, look for safe measures that can bring you relief. You can take simple steps at home to manage morning sickness. These steps include changing what and when you eat and avoiding certain foods and smells. Some women find that acupuncture and acupressure wristbands also help. Follow-up care is a key part of your treatment and safety. Be sure to make and go to all appointments, and call your doctor if you are having problems. It's also a good idea to know your test results and keep a list of the medicines you take. How can you care for yourself at home? · Keep food in your stomach, but not too much at once. Your nausea may be worse if your stomach is empty. Eat five or six small meals a day instead of three large meals. · For morning nausea, eat a small snack, such as a couple of crackers or dry biscuits, before rising. Allow a few minutes for your stomach to settle before you get out of bed slowly. · Drink plenty of fluids, enough so that your urine is light yellow or clear like water. If you have kidney, heart, or liver disease and have to limit fluids, talk with your doctor before you increase the amount of fluids you drink. Some women find that peppermint tea helps with nausea.   · Eat more protein, such as chicken, fish, lean meat, beans, nuts, and seeds. · Eat carbohydrate foods, such as potatoes, whole-grain cereals, rice, and pasta. · Avoid smells and foods that make you feel nauseated. Spicy or high-fat foods, citrus juice, milk, coffee, and tea with caffeine often make nausea worse. · Do not drink alcohol. · Do not smoke. Try not to be around others who smoke. If you need help quitting, talk to your doctor about stop-smoking programs and medicines. These can increase your chances of quitting for good. · If you are taking iron supplements, ask your doctor if they are necessary. Iron can make nausea worse. · Get lots of rest. Stress and fatigue can make your morning sickness worse. · Ask your doctor about taking prescription medicine, or over-the-counter products such as vitamin B6, doxylamine, or sonido, to relieve your symptoms. Your doctor can tell you the doses that are safe for you. · Take your prenatal vitamins at night on a full stomach. When should you call for help? Call 911 anytime you think you may need emergency care. For example, call if:    · You passed out (lost consciousness).    Call your doctor now or seek immediate medical care if:    · You are sick to your stomach or cannot drink fluids.     · You have symptoms of dehydration, such as:  ? Dry eyes and a dry mouth. ? Passing only a little urine. ? Feeling thirstier than usual.     · You are not able to keep down your medicine.     · You have pain in your belly or pelvis.    Watch closely for changes in your health, and be sure to contact your doctor if:    · You do not get better as expected. Where can you learn more? Go to https://norin.tvcody.ICU Metrix. org and sign in to your [employer] account. Enter Y785 in the JustParts box to learn more about \"Managing Morning Sickness: Care Instructions. \"     If you do not have an account, please click on the \"Sign Up Now\" link.   Current as of: September 5, 2018  Content Version: 12.1  © 4847-6954 Healthwise, Incorporated. Care instructions adapted under license by [facility] If you have questions about a medical condition or this instruction, always ask your healthcare professional. [address] any warranty or liability for your use of this information. Patient Education        Weeks 34 to 39 of Your Pregnancy: Care Instructions  Your Care Instructions    By now, your baby and your belly have grown quite large. It is almost time to give birth. Your baby's lungs are almost ready to breathe air. The bones in your baby's head are now firm enough to protect it, but soft enough to move down through the birth canal.  You may feel excited, happy, anxious, or scared. You may wonder how you will know if you are in labor or what to expect during labor. Try to be flexible in your expectations of the birth. Because each birth is different, there is no way to know exactly what childbirth will be like for you. This care sheet will help you know what to expect and how to prepare. This may make your childbirth easier. If you haven't already had the Tdap shot during this pregnancy, talk to your doctor about getting it. It will help protect your  against pertussis infection. In the 36th week, most women have a test for group B streptococcus (GBS). GBS is a common bacteria that can live in the vagina and rectum. It can make your baby sick after birth. If you test positive, you will get antibiotics during labor. The medicine will keep your baby from getting the bacteria. Follow-up care is a key part of your treatment and safety. Be sure to make and go to all appointments, and call your doctor if you are having problems. It's also a good idea to know your test results and keep a list of the medicines you take. How can you care for yourself at home? Learn about pain relief choices  · Pain is different for every woman.  Talk with your doctor about your feelings about pain. · You can choose from several types of pain relief. These include medicine or breathing techniques, as well as comfort measures. You can use more than one option. · If you choose to have pain medicine during labor, talk to your doctor about your options. Some medicines lower anxiety and help with some of the pain. Others make your lower body numb so that you won't feel pain. · Be sure to tell your doctor about your pain medicine choice before you start labor or very early in your labor. You may be able to change your mind as labor progresses. · Rarely, a woman is put to sleep by medicine given through a mask or an IV. Labor and delivery  · The first stage of labor has three parts: early, active, and transition. ? Most women have early labor at home. You can stay busy or rest, eat light snacks, drink clear fluids, and start counting contractions. ? When talking during a contraction gets hard, you may be moving to active labor. During active labor, you should head for the hospital if you are not there already. ? You are in active labor when contractions come every 3 to 4 minutes and last about 60 seconds. Your cervix is opening more rapidly. ? If your water breaks, contractions will come faster and stronger. ? During transition, your cervix is stretching, and contractions are coming more rapidly. ? You may want to push, but your cervix might not be ready. Your doctor will tell you when to push. · The second stage starts when your cervix is completely opened and you are ready to push. ? Contractions are very strong to push the baby down the birth canal.  ? You will feel the urge to push. You may feel like you need to have a bowel movement. ? You may be coached to push with contractions. These contractions will be very strong, but you will not have them as often. You can get a little rest between contractions. ? You may be emotional and irritable.  You may not be aware of what is going

## 2019-09-25 NOTE — PROGRESS NOTES
Immunizations Administered     Name Date Dose Route    Influenza, Quadv, IM, (6 mo and older Fluzone, Flulaval, Fluarix and 3 yrs and older Afluria) 9/25/2019 0.5 mL Intramuscular    Site: Deltoid- Left    Lot: [id number]    Terri [address]: [id number]

## 2019-10-04 ENCOUNTER — NURSE ONLY (OUTPATIENT)
Dept: LAB | Age: 19
End: 2019-10-04

## 2019-11-04 ENCOUNTER — ANESTHESIA EVENT (OUTPATIENT)
Dept: LABOR AND DELIVERY | Age: 19
End: 2019-11-04
Payer: COMMERCIAL

## 2019-11-04 ENCOUNTER — ANESTHESIA (OUTPATIENT)
Dept: LABOR AND DELIVERY | Age: 19
End: 2019-11-04
Payer: COMMERCIAL

## 2019-11-04 ENCOUNTER — HOSPITAL ENCOUNTER (INPATIENT)
Age: 19
LOS: 2 days | Discharge: HOME | End: 2019-11-06
Attending: OBSTETRICS & GYNECOLOGY | Admitting: OBSTETRICS & GYNECOLOGY
Payer: COMMERCIAL

## 2019-11-04 LAB
ABO: NORMAL
AMNISURE PATIENT RESULT: NORMAL
AMPHETAMINE+METHAMPHETAMINE URINE SCREEN: NEGATIVE
ANTIBODY SCREEN: NORMAL
BARBITURATE QUANTITATIVE URINE: NEGATIVE
BASOPHILS # BLD: 0.1 %
BASOPHILS ABSOLUTE: 0 THOU/MM3 (ref 0–0.1)
BENZODIAZEPINE QUANTITATIVE URINE: NEGATIVE
CANNABINOID QUANTITATIVE URINE: NEGATIVE
COCAINE METABOLITE QUANTITATIVE URINE: NEGATIVE
EOSINOPHIL # BLD: 0.7 %
EOSINOPHILS ABSOLUTE: 0.1 THOU/MM3 (ref 0–0.4)
ERYTHROCYTE [DISTWIDTH] IN BLOOD BY AUTOMATED COUNT: 13.3 % (ref 11.5–14.5)
ERYTHROCYTE [DISTWIDTH] IN BLOOD BY AUTOMATED COUNT: 43.5 FL (ref 35–45)
HCT VFR BLD CALC: 36.6 % (ref 37–47)
HEMOGLOBIN: 12.6 GM/DL (ref 12–16)
IMMATURE GRANS (ABS): 0.19 THOU/MM3 (ref 0–0.07)
IMMATURE GRANULOCYTES: 1.3 %
LYMPHOCYTES # BLD: 14.4 %
LYMPHOCYTES ABSOLUTE: 2.1 THOU/MM3 (ref 1–4.8)
MCH RBC QN AUTO: 30.7 PG (ref 26–33)
MCHC RBC AUTO-ENTMCNC: 34.4 GM/DL (ref 32.2–35.5)
MCV RBC AUTO: 89.3 FL (ref 81–99)
MONOCYTES # BLD: 8.2 %
MONOCYTES ABSOLUTE: 1.2 THOU/MM3 (ref 0.4–1.3)
NUCLEATED RED BLOOD CELLS: 0 /100 WBC
OPIATES, URINE: NEGATIVE
OXYCODONE: NEGATIVE
PHENCYCLIDINE QUANTITATIVE URINE: NEGATIVE
PLATELET # BLD: 242 THOU/MM3 (ref 130–400)
PMV BLD AUTO: 11.3 FL (ref 9.4–12.4)
RBC # BLD: 4.1 MILL/MM3 (ref 4.2–5.4)
RH FACTOR: NORMAL
RPR: NONREACTIVE
SEG NEUTROPHILS: 75.3 %
SEGMENTED NEUTROPHILS ABSOLUTE COUNT: 11.1 THOU/MM3 (ref 1.8–7.7)
WBC # BLD: 14.8 THOU/MM3 (ref 4.8–10.8)

## 2019-11-04 PROCEDURE — 6360000002 HC RX W HCPCS

## 2019-11-04 PROCEDURE — 86592 SYPHILIS TEST NON-TREP QUAL: CPT

## 2019-11-04 PROCEDURE — 0HQ9XZZ REPAIR PERINEUM SKIN, EXTERNAL APPROACH: ICD-10-PCS | Performed by: OBSTETRICS & GYNECOLOGY

## 2019-11-04 PROCEDURE — 86901 BLOOD TYPING SEROLOGIC RH(D): CPT

## 2019-11-04 PROCEDURE — 84112 EVAL AMNIOTIC FLUID PROTEIN: CPT

## 2019-11-04 PROCEDURE — 80307 DRUG TEST PRSMV CHEM ANLYZR: CPT

## 2019-11-04 PROCEDURE — 6370000000 HC RX 637 (ALT 250 FOR IP): Performed by: OBSTETRICS & GYNECOLOGY

## 2019-11-04 PROCEDURE — 1220000000 HC SEMI PRIVATE OB R&B

## 2019-11-04 PROCEDURE — 3700000025 EPIDURAL BLOCK: Performed by: ANESTHESIOLOGY

## 2019-11-04 PROCEDURE — 7200000001 HC VAGINAL DELIVERY

## 2019-11-04 PROCEDURE — 0UQMXZZ REPAIR VULVA, EXTERNAL APPROACH: ICD-10-PCS | Performed by: OBSTETRICS & GYNECOLOGY

## 2019-11-04 PROCEDURE — 2500000003 HC RX 250 WO HCPCS: Performed by: ANESTHESIOLOGY

## 2019-11-04 PROCEDURE — 86850 RBC ANTIBODY SCREEN: CPT

## 2019-11-04 PROCEDURE — 2709999900 HC NON-CHARGEABLE SUPPLY

## 2019-11-04 PROCEDURE — 86900 BLOOD TYPING SEROLOGIC ABO: CPT

## 2019-11-04 PROCEDURE — 85025 COMPLETE CBC W/AUTO DIFF WBC: CPT

## 2019-11-04 PROCEDURE — 36415 COLL VENOUS BLD VENIPUNCTURE: CPT

## 2019-11-04 PROCEDURE — 2580000003 HC RX 258: Performed by: OBSTETRICS & GYNECOLOGY

## 2019-11-04 PROCEDURE — 6360000002 HC RX W HCPCS: Performed by: OBSTETRICS & GYNECOLOGY

## 2019-11-04 RX ORDER — DOCUSATE SODIUM 100 MG/1
100 CAPSULE, LIQUID FILLED ORAL 2 TIMES DAILY PRN
Status: DISCONTINUED | OUTPATIENT
Start: 2019-11-04 | End: 2019-11-06 | Stop reason: HOSPADM

## 2019-11-04 RX ORDER — BUTORPHANOL TARTRATE 1 MG/ML
1 INJECTION, SOLUTION INTRAMUSCULAR; INTRAVENOUS
Status: DISCONTINUED | OUTPATIENT
Start: 2019-11-04 | End: 2019-11-04 | Stop reason: HOSPADM

## 2019-11-04 RX ORDER — HYDROCODONE BITARTRATE AND ACETAMINOPHEN 5; 325 MG/1; MG/1
1 TABLET ORAL EVERY 4 HOURS PRN
Status: DISCONTINUED | OUTPATIENT
Start: 2019-11-04 | End: 2019-11-06 | Stop reason: HOSPADM

## 2019-11-04 RX ORDER — DIPHENHYDRAMINE HYDROCHLORIDE 50 MG/ML
25 INJECTION INTRAMUSCULAR; INTRAVENOUS EVERY 4 HOURS PRN
Status: DISCONTINUED | OUTPATIENT
Start: 2019-11-04 | End: 2019-11-04 | Stop reason: HOSPADM

## 2019-11-04 RX ORDER — NALBUPHINE HCL 10 MG/ML
5 AMPUL (ML) INJECTION EVERY 4 HOURS PRN
Status: DISCONTINUED | OUTPATIENT
Start: 2019-11-04 | End: 2019-11-04 | Stop reason: HOSPADM

## 2019-11-04 RX ORDER — ROPIVACAINE HYDROCHLORIDE 2 MG/ML
INJECTION, SOLUTION EPIDURAL; INFILTRATION; PERINEURAL
Status: DISCONTINUED
Start: 2019-11-04 | End: 2019-11-04

## 2019-11-04 RX ORDER — ONDANSETRON 2 MG/ML
8 INJECTION INTRAMUSCULAR; INTRAVENOUS EVERY 6 HOURS PRN
Status: DISCONTINUED | OUTPATIENT
Start: 2019-11-04 | End: 2019-11-04 | Stop reason: HOSPADM

## 2019-11-04 RX ORDER — IBUPROFEN 800 MG/1
800 TABLET ORAL EVERY 8 HOURS PRN
Status: DISCONTINUED | OUTPATIENT
Start: 2019-11-04 | End: 2019-11-04 | Stop reason: HOSPADM

## 2019-11-04 RX ORDER — METHYLERGONOVINE MALEATE 0.2 MG/ML
200 INJECTION INTRAVENOUS PRN
Status: DISCONTINUED | OUTPATIENT
Start: 2019-11-04 | End: 2019-11-04 | Stop reason: HOSPADM

## 2019-11-04 RX ORDER — METHYLERGONOVINE MALEATE 0.2 MG/ML
200 INJECTION INTRAVENOUS SEE ADMIN INSTRUCTIONS
Status: DISCONTINUED | OUTPATIENT
Start: 2019-11-04 | End: 2019-11-06 | Stop reason: HOSPADM

## 2019-11-04 RX ORDER — SEVOFLURANE 250 ML/250ML
1 LIQUID RESPIRATORY (INHALATION) CONTINUOUS PRN
Status: DISCONTINUED | OUTPATIENT
Start: 2019-11-04 | End: 2019-11-04 | Stop reason: HOSPADM

## 2019-11-04 RX ORDER — CARBOPROST TROMETHAMINE 250 UG/ML
250 INJECTION, SOLUTION INTRAMUSCULAR
Status: DISPENSED | OUTPATIENT
Start: 2019-11-04 | End: 2019-11-04

## 2019-11-04 RX ORDER — SODIUM CHLORIDE 0.9 % (FLUSH) 0.9 %
10 SYRINGE (ML) INJECTION PRN
Status: DISCONTINUED | OUTPATIENT
Start: 2019-11-04 | End: 2019-11-04 | Stop reason: HOSPADM

## 2019-11-04 RX ORDER — ACETAMINOPHEN 325 MG/1
650 TABLET ORAL EVERY 4 HOURS PRN
Status: DISCONTINUED | OUTPATIENT
Start: 2019-11-04 | End: 2019-11-06 | Stop reason: HOSPADM

## 2019-11-04 RX ORDER — ONDANSETRON 2 MG/ML
4 INJECTION INTRAMUSCULAR; INTRAVENOUS EVERY 6 HOURS PRN
Status: DISCONTINUED | OUTPATIENT
Start: 2019-11-04 | End: 2019-11-04 | Stop reason: HOSPADM

## 2019-11-04 RX ORDER — FERROUS SULFATE 325(65) MG
325 TABLET ORAL
Status: DISCONTINUED | OUTPATIENT
Start: 2019-11-05 | End: 2019-11-06 | Stop reason: HOSPADM

## 2019-11-04 RX ORDER — ONDANSETRON 4 MG/1
8 TABLET, FILM COATED ORAL EVERY 8 HOURS PRN
Status: DISCONTINUED | OUTPATIENT
Start: 2019-11-04 | End: 2019-11-06 | Stop reason: HOSPADM

## 2019-11-04 RX ORDER — SODIUM CHLORIDE 0.9 % (FLUSH) 0.9 %
10 SYRINGE (ML) INJECTION PRN
Status: DISCONTINUED | OUTPATIENT
Start: 2019-11-04 | End: 2019-11-06 | Stop reason: HOSPADM

## 2019-11-04 RX ORDER — SODIUM CHLORIDE 0.9 % (FLUSH) 0.9 %
10 SYRINGE (ML) INJECTION EVERY 12 HOURS SCHEDULED
Status: DISCONTINUED | OUTPATIENT
Start: 2019-11-04 | End: 2019-11-04 | Stop reason: HOSPADM

## 2019-11-04 RX ORDER — SODIUM CHLORIDE, SODIUM LACTATE, POTASSIUM CHLORIDE, CALCIUM CHLORIDE 600; 310; 30; 20 MG/100ML; MG/100ML; MG/100ML; MG/100ML
INJECTION, SOLUTION INTRAVENOUS CONTINUOUS
Status: DISCONTINUED | OUTPATIENT
Start: 2019-11-04 | End: 2019-11-06 | Stop reason: HOSPADM

## 2019-11-04 RX ORDER — TERBUTALINE SULFATE 1 MG/ML
0.25 INJECTION, SOLUTION SUBCUTANEOUS
Status: DISCONTINUED | OUTPATIENT
Start: 2019-11-04 | End: 2019-11-04 | Stop reason: HOSPADM

## 2019-11-04 RX ORDER — SEVOFLURANE 250 ML/250ML
1 LIQUID RESPIRATORY (INHALATION) CONTINUOUS PRN
Status: DISCONTINUED | OUTPATIENT
Start: 2019-11-04 | End: 2019-11-04

## 2019-11-04 RX ORDER — IBUPROFEN 800 MG/1
800 TABLET ORAL EVERY 8 HOURS SCHEDULED
Status: DISCONTINUED | OUTPATIENT
Start: 2019-11-04 | End: 2019-11-06 | Stop reason: HOSPADM

## 2019-11-04 RX ORDER — MISOPROSTOL 200 UG/1
1000 TABLET ORAL PRN
Status: DISCONTINUED | OUTPATIENT
Start: 2019-11-04 | End: 2019-11-06 | Stop reason: HOSPADM

## 2019-11-04 RX ORDER — ACETAMINOPHEN 325 MG/1
650 TABLET ORAL EVERY 4 HOURS PRN
Status: DISCONTINUED | OUTPATIENT
Start: 2019-11-04 | End: 2019-11-04 | Stop reason: HOSPADM

## 2019-11-04 RX ORDER — NALOXONE HYDROCHLORIDE 0.4 MG/ML
0.4 INJECTION, SOLUTION INTRAMUSCULAR; INTRAVENOUS; SUBCUTANEOUS PRN
Status: DISCONTINUED | OUTPATIENT
Start: 2019-11-04 | End: 2019-11-04 | Stop reason: HOSPADM

## 2019-11-04 RX ORDER — CARBOPROST TROMETHAMINE 250 UG/ML
250 INJECTION, SOLUTION INTRAMUSCULAR PRN
Status: DISCONTINUED | OUTPATIENT
Start: 2019-11-04 | End: 2019-11-06 | Stop reason: HOSPADM

## 2019-11-04 RX ORDER — SODIUM CHLORIDE 0.9 % (FLUSH) 0.9 %
10 SYRINGE (ML) INJECTION EVERY 12 HOURS SCHEDULED
Status: DISCONTINUED | OUTPATIENT
Start: 2019-11-04 | End: 2019-11-06 | Stop reason: HOSPADM

## 2019-11-04 RX ORDER — MORPHINE SULFATE 4 MG/ML
4 INJECTION, SOLUTION INTRAMUSCULAR; INTRAVENOUS
Status: DISCONTINUED | OUTPATIENT
Start: 2019-11-04 | End: 2019-11-04 | Stop reason: HOSPADM

## 2019-11-04 RX ORDER — SODIUM CHLORIDE, SODIUM LACTATE, POTASSIUM CHLORIDE, CALCIUM CHLORIDE 600; 310; 30; 20 MG/100ML; MG/100ML; MG/100ML; MG/100ML
INJECTION, SOLUTION INTRAVENOUS CONTINUOUS
Status: DISCONTINUED | OUTPATIENT
Start: 2019-11-04 | End: 2019-11-04

## 2019-11-04 RX ORDER — MISOPROSTOL 200 UG/1
1000 TABLET ORAL PRN
Status: DISCONTINUED | OUTPATIENT
Start: 2019-11-04 | End: 2019-11-04 | Stop reason: HOSPADM

## 2019-11-04 RX ORDER — LIDOCAINE HYDROCHLORIDE 10 MG/ML
30 INJECTION, SOLUTION EPIDURAL; INFILTRATION; INTRACAUDAL; PERINEURAL PRN
Status: DISCONTINUED | OUTPATIENT
Start: 2019-11-04 | End: 2019-11-04 | Stop reason: HOSPADM

## 2019-11-04 RX ORDER — MORPHINE SULFATE 2 MG/ML
2 INJECTION, SOLUTION INTRAMUSCULAR; INTRAVENOUS
Status: DISCONTINUED | OUTPATIENT
Start: 2019-11-04 | End: 2019-11-04 | Stop reason: HOSPADM

## 2019-11-04 RX ORDER — LANOLIN 100 %
OINTMENT (GRAM) TOPICAL PRN
Status: DISCONTINUED | OUTPATIENT
Start: 2019-11-04 | End: 2019-11-06 | Stop reason: HOSPADM

## 2019-11-04 RX ADMIN — IBUPROFEN 800 MG: 800 TABLET, FILM COATED ORAL at 15:39

## 2019-11-04 RX ADMIN — SODIUM CHLORIDE, POTASSIUM CHLORIDE, SODIUM LACTATE AND CALCIUM CHLORIDE: 600; 310; 30; 20 INJECTION, SOLUTION INTRAVENOUS at 10:55

## 2019-11-04 RX ADMIN — BENZOCAINE AND LEVOMENTHOL: 200; 5 SPRAY TOPICAL at 16:12

## 2019-11-04 RX ADMIN — ACETAMINOPHEN 650 MG: 325 TABLET ORAL at 20:04

## 2019-11-04 RX ADMIN — SODIUM CHLORIDE, POTASSIUM CHLORIDE, SODIUM LACTATE AND CALCIUM CHLORIDE: 600; 310; 30; 20 INJECTION, SOLUTION INTRAVENOUS at 05:00

## 2019-11-04 RX ADMIN — Medication 18 ML/HR: at 10:16

## 2019-11-04 RX ADMIN — SODIUM CHLORIDE, POTASSIUM CHLORIDE, SODIUM LACTATE AND CALCIUM CHLORIDE: 600; 310; 30; 20 INJECTION, SOLUTION INTRAVENOUS at 08:58

## 2019-11-04 RX ADMIN — DOCUSATE SODIUM 100 MG: 100 CAPSULE, LIQUID FILLED ORAL at 20:03

## 2019-11-04 RX ADMIN — ONDANSETRON 8 MG: 2 INJECTION INTRAMUSCULAR; INTRAVENOUS at 06:33

## 2019-11-04 RX ADMIN — SODIUM CHLORIDE, POTASSIUM CHLORIDE, SODIUM LACTATE AND CALCIUM CHLORIDE: 600; 310; 30; 20 INJECTION, SOLUTION INTRAVENOUS at 14:02

## 2019-11-04 RX ADMIN — Medication 1 MILLI-UNITS/MIN: at 08:40

## 2019-11-04 ASSESSMENT — PAIN DESCRIPTION - DESCRIPTORS
DESCRIPTORS: SHARP
DESCRIPTORS: CRAMPING

## 2019-11-04 ASSESSMENT — PAIN SCALES - GENERAL: PAINLEVEL_OUTOF10: 3

## 2019-11-05 LAB — HEMOGLOBIN: 9.9 GM/DL (ref 12–16)

## 2019-11-05 PROCEDURE — 85018 HEMOGLOBIN: CPT

## 2019-11-05 PROCEDURE — 6370000000 HC RX 637 (ALT 250 FOR IP): Performed by: OBSTETRICS & GYNECOLOGY

## 2019-11-05 PROCEDURE — 1220000000 HC SEMI PRIVATE OB R&B

## 2019-11-05 PROCEDURE — 2709999900 HC NON-CHARGEABLE SUPPLY

## 2019-11-05 PROCEDURE — 36415 COLL VENOUS BLD VENIPUNCTURE: CPT

## 2019-11-05 RX ADMIN — HYDROCODONE BITARTRATE AND ACETAMINOPHEN 1 TABLET: 5; 325 TABLET ORAL at 00:25

## 2019-11-05 RX ADMIN — DOCUSATE SODIUM 100 MG: 100 CAPSULE, LIQUID FILLED ORAL at 08:33

## 2019-11-05 RX ADMIN — IBUPROFEN 800 MG: 800 TABLET, FILM COATED ORAL at 08:33

## 2019-11-05 RX ADMIN — IBUPROFEN 800 MG: 800 TABLET, FILM COATED ORAL at 17:15

## 2019-11-05 RX ADMIN — HYDROCODONE BITARTRATE AND ACETAMINOPHEN 1 TABLET: 5; 325 TABLET ORAL at 17:15

## 2019-11-05 RX ADMIN — DOCUSATE SODIUM 100 MG: 100 CAPSULE, LIQUID FILLED ORAL at 19:43

## 2019-11-05 RX ADMIN — IBUPROFEN 800 MG: 800 TABLET, FILM COATED ORAL at 00:26

## 2019-11-05 RX ADMIN — HYDROCODONE BITARTRATE AND ACETAMINOPHEN 1 TABLET: 5; 325 TABLET ORAL at 22:34

## 2019-11-05 ASSESSMENT — PAIN SCALES - GENERAL
PAINLEVEL_OUTOF10: 6
PAINLEVEL_OUTOF10: 4

## 2019-11-06 VITALS
HEIGHT: 64 IN | RESPIRATION RATE: 16 BRPM | BODY MASS INDEX: 34.15 KG/M2 | WEIGHT: 200 LBS | HEART RATE: 108 BPM | OXYGEN SATURATION: 100 % | DIASTOLIC BLOOD PRESSURE: 73 MMHG | TEMPERATURE: 97.4 F | SYSTOLIC BLOOD PRESSURE: 142 MMHG

## 2019-11-06 PROCEDURE — 2709999900 HC NON-CHARGEABLE SUPPLY

## 2019-11-06 PROCEDURE — 6370000000 HC RX 637 (ALT 250 FOR IP): Performed by: OBSTETRICS & GYNECOLOGY

## 2019-11-06 RX ADMIN — HYDROCODONE BITARTRATE AND ACETAMINOPHEN 1 TABLET: 5; 325 TABLET ORAL at 04:40

## 2019-11-06 RX ADMIN — HYDROCODONE BITARTRATE AND ACETAMINOPHEN 1 TABLET: 5; 325 TABLET ORAL at 10:12

## 2019-11-06 RX ADMIN — DOCUSATE SODIUM 100 MG: 100 CAPSULE, LIQUID FILLED ORAL at 10:12

## 2019-11-06 RX ADMIN — IBUPROFEN 800 MG: 800 TABLET, FILM COATED ORAL at 04:41

## 2019-11-06 ASSESSMENT — PAIN SCALES - GENERAL: PAINLEVEL_OUTOF10: 7

## 2020-01-20 ENCOUNTER — HOSPITAL ENCOUNTER (EMERGENCY)
Age: 20
Discharge: HOME | End: 2020-01-20
Payer: COMMERCIAL

## 2020-01-20 VITALS
HEART RATE: 76 BPM | WEIGHT: 176 LBS | OXYGEN SATURATION: 98 % | SYSTOLIC BLOOD PRESSURE: 110 MMHG | HEIGHT: 64 IN | RESPIRATION RATE: 16 BRPM | TEMPERATURE: 97.6 F | DIASTOLIC BLOOD PRESSURE: 55 MMHG | BODY MASS INDEX: 30.05 KG/M2

## 2020-01-20 PROCEDURE — 99213 OFFICE O/P EST LOW 20 MIN: CPT | Performed by: NURSE PRACTITIONER

## 2020-01-20 PROCEDURE — 99213 OFFICE O/P EST LOW 20 MIN: CPT

## 2020-01-20 RX ORDER — AMOXICILLIN 500 MG/1
500 TABLET, FILM COATED ORAL 2 TIMES DAILY
Qty: 20 TABLET | Refills: 0 | Status: SHIPPED | OUTPATIENT
Start: 2020-01-20 | End: 2020-01-30

## 2020-01-20 RX ORDER — IBUPROFEN 800 MG/1
800 TABLET ORAL EVERY 6 HOURS PRN
COMMUNITY

## 2020-01-20 ASSESSMENT — PAIN DESCRIPTION - PROGRESSION: CLINICAL_PROGRESSION: GRADUALLY WORSENING

## 2020-01-20 ASSESSMENT — PAIN DESCRIPTION - ONSET: ONSET: GRADUAL

## 2020-01-20 ASSESSMENT — PAIN SCALES - GENERAL: PAINLEVEL_OUTOF10: 7

## 2020-01-20 ASSESSMENT — ENCOUNTER SYMPTOMS
COUGH: 0
SINUS PAIN: 0
SORE THROAT: 1
WHEEZING: 0
SINUS PRESSURE: 0
EYE PAIN: 0
EYE DISCHARGE: 0
EYE ITCHING: 0
CHEST TIGHTNESS: 0
SHORTNESS OF BREATH: 0
STRIDOR: 0
EYE REDNESS: 0

## 2020-01-20 ASSESSMENT — PAIN DESCRIPTION - ORIENTATION: ORIENTATION: RIGHT

## 2020-01-20 ASSESSMENT — PAIN DESCRIPTION - DESCRIPTORS: DESCRIPTORS: ACHING;SORE

## 2020-01-20 ASSESSMENT — PAIN DESCRIPTION - FREQUENCY: FREQUENCY: CONTINUOUS

## 2020-01-20 ASSESSMENT — PAIN DESCRIPTION - PAIN TYPE: TYPE: ACUTE PAIN

## 2020-01-20 ASSESSMENT — PAIN DESCRIPTION - LOCATION: LOCATION: EAR

## 2020-01-20 ASSESSMENT — PAIN - FUNCTIONAL ASSESSMENT: PAIN_FUNCTIONAL_ASSESSMENT: ACTIVITIES ARE NOT PREVENTED

## 2020-01-20 NOTE — ED PROVIDER NOTES
Skin: Negative for rash. Neurological: Negative for dizziness, weakness, light-headedness, numbness and headaches. PAST MEDICAL HISTORY         Diagnosis Date    Rh incompatibility     RHogam given       SURGICALHISTORY     Patient  has no past surgical history on file. CURRENT MEDICATIONS       Discharge Medication List as of 1/20/2020  1:49 PM      CONTINUE these medications which have NOT CHANGED    Details   ibuprofen (ADVIL;MOTRIN) 800 MG tablet Take 800 mg by mouth every 6 hours as needed for PainHistorical Med      Prenatal Vit-Fe Fumarate-FA (PRENATAL 1+1 PO) Take by mouthHistorical Med             ALLERGIES     Patient is has No Known Allergies. Patients   Immunization History   Administered Date(s) Administered    Influenza, Quadv, IM, (6 mo and older Fluzone, Flulaval, Fluarix and 3 yrs and older Afluria) 09/25/2019       FAMILY HISTORY     Patient's family history includes Cancer in her father; No Known Problems in her mother. SOCIAL HISTORY     Patient  reports that she has never smoked. She has never used smokeless tobacco. She reports previous drug use. Drug: Marijuana. She reports that she does not drink alcohol. PHYSICAL EXAM     ED TRIAGE VITALS  BP: (!) 110/55, Temp: 97.6 °F (36.4 °C), Pulse: 76, Resp: 16, SpO2: 98 %,Estimated body mass index is 30.21 kg/m² as calculated from the following:    Height as of this encounter: 5' 4\" (1.626 m). Weight as of this encounter: 176 lb (79.8 kg). ,Patient's last menstrual period was 12/27/2019. Physical Exam  Constitutional:       General: She is not in acute distress. Appearance: Normal appearance. She is not ill-appearing, toxic-appearing or diaphoretic. HENT:      Right Ear: No decreased hearing noted. Tenderness present. No drainage. No middle ear effusion. There is no impacted cerumen. Tympanic membrane is injected and erythematous. Tympanic membrane is not scarred, perforated, retracted or bulging.  Tympanic membrane Motrin for pain management. Patient should not continue use of olive oil, as this could cause more irritation to ear. Should not submerge head underwater or stick anything into ear canal, as this could cause trauma or further irritation. Recommend follow-up with primary care provider within the next 3 to 4 days if symptoms do not seem to be improving.         PATIENT REFERRED TO:  Penelope Ruiz MD  [facility] / [address] [facility] [place] [address]      DISCHARGE MEDICATIONS:  Discharge Medication List as of 1/20/2020  1:49 PM      START taking these medications    Details   Amoxicillin 500 MG TABS Take 500 mg by mouth 2 times daily for 10 days, Disp-20 tablet, R-0Print             Discharge Medication List as of 1/20/2020  1:49 PM          Discharge Medication List as of 1/20/2020  1:49 PM          MATTHIAS Lima NP    (Please note that portions of this note were completed with a voice recognition program. Efforts were made to edit the dictations but occasionally words are mis-transcribed.)         MATTHIAS Albright NP  01/20/20 0846

## 2020-01-20 NOTE — ED NOTES
All discharge education and information given. Pt instructed to go to ED for any shortness of breath, chest pain or if ear pain worsens. Verbalized Understanding. Left stable.      Pritesh Bazzi RN  01/20/20 2389

## 2020-01-21 ENCOUNTER — HOSPITAL ENCOUNTER (EMERGENCY)
Age: 20
Discharge: HOME | End: 2020-01-21
Payer: COMMERCIAL

## 2020-01-21 ENCOUNTER — APPOINTMENT (OUTPATIENT)
Dept: CT IMAGING | Age: 20
End: 2020-01-21
Payer: COMMERCIAL

## 2020-01-21 ENCOUNTER — TELEPHONE (OUTPATIENT)
Dept: FAMILY MEDICINE CLINIC | Age: 20
End: 2020-01-21

## 2020-01-21 VITALS
OXYGEN SATURATION: 98 % | RESPIRATION RATE: 18 BRPM | HEIGHT: 64 IN | BODY MASS INDEX: 27.31 KG/M2 | WEIGHT: 160 LBS | HEART RATE: 86 BPM | SYSTOLIC BLOOD PRESSURE: 114 MMHG | DIASTOLIC BLOOD PRESSURE: 74 MMHG | TEMPERATURE: 97.8 F

## 2020-01-21 LAB
ANION GAP SERPL CALCULATED.3IONS-SCNC: 10 MEQ/L (ref 8–16)
BASOPHILS # BLD: 0.4 %
BASOPHILS ABSOLUTE: 0 THOU/MM3 (ref 0–0.1)
BUN BLDV-MCNC: 9 MG/DL (ref 7–22)
CALCIUM SERPL-MCNC: 8.8 MG/DL (ref 8.5–10.5)
CHLORIDE BLD-SCNC: 104 MEQ/L (ref 98–111)
CO2: 23 MEQ/L (ref 23–33)
CREAT SERPL-MCNC: 0.5 MG/DL (ref 0.4–1.2)
EOSINOPHIL # BLD: 3.3 %
EOSINOPHILS ABSOLUTE: 0.2 THOU/MM3 (ref 0–0.4)
ERYTHROCYTE [DISTWIDTH] IN BLOOD BY AUTOMATED COUNT: 12 % (ref 11.5–14.5)
ERYTHROCYTE [DISTWIDTH] IN BLOOD BY AUTOMATED COUNT: 39 FL (ref 35–45)
GFR SERPL CREATININE-BSD FRML MDRD: > 90 ML/MIN/1.73M2
GLUCOSE BLD-MCNC: 104 MG/DL (ref 70–108)
HCT VFR BLD CALC: 36.7 % (ref 37–47)
HEMOGLOBIN: 12.4 GM/DL (ref 12–16)
IMMATURE GRANS (ABS): 0.01 THOU/MM3 (ref 0–0.07)
IMMATURE GRANULOCYTES: 0.1 %
LYMPHOCYTES # BLD: 34.7 %
LYMPHOCYTES ABSOLUTE: 2.5 THOU/MM3 (ref 1–4.8)
MCH RBC QN AUTO: 29.9 PG (ref 26–33)
MCHC RBC AUTO-ENTMCNC: 33.8 GM/DL (ref 32.2–35.5)
MCV RBC AUTO: 88.4 FL (ref 81–99)
MONOCYTES # BLD: 10.2 %
MONOCYTES ABSOLUTE: 0.7 THOU/MM3 (ref 0.4–1.3)
NUCLEATED RED BLOOD CELLS: 0 /100 WBC
OSMOLALITY CALCULATION: 272.8 MOSMOL/KG (ref 275–300)
PLATELET # BLD: 266 THOU/MM3 (ref 130–400)
PMV BLD AUTO: 10.3 FL (ref 9.4–12.4)
POTASSIUM SERPL-SCNC: 4 MEQ/L (ref 3.5–5.2)
PREGNANCY, SERUM: NEGATIVE
RBC # BLD: 4.15 MILL/MM3 (ref 4.2–5.4)
SEG NEUTROPHILS: 51.3 %
SEGMENTED NEUTROPHILS ABSOLUTE COUNT: 3.6 THOU/MM3 (ref 1.8–7.7)
SODIUM BLD-SCNC: 137 MEQ/L (ref 135–145)
WBC # BLD: 7.1 THOU/MM3 (ref 4.8–10.8)

## 2020-01-21 PROCEDURE — 84703 CHORIONIC GONADOTROPIN ASSAY: CPT

## 2020-01-21 PROCEDURE — 70481 CT ORBIT/EAR/FOSSA W/DYE: CPT

## 2020-01-21 PROCEDURE — 36415 COLL VENOUS BLD VENIPUNCTURE: CPT

## 2020-01-21 PROCEDURE — 96374 THER/PROPH/DIAG INJ IV PUSH: CPT

## 2020-01-21 PROCEDURE — 80048 BASIC METABOLIC PNL TOTAL CA: CPT

## 2020-01-21 PROCEDURE — 99283 EMERGENCY DEPT VISIT LOW MDM: CPT

## 2020-01-21 PROCEDURE — 85025 COMPLETE CBC W/AUTO DIFF WBC: CPT

## 2020-01-21 PROCEDURE — 6360000002 HC RX W HCPCS: Performed by: NURSE PRACTITIONER

## 2020-01-21 PROCEDURE — 6360000004 HC RX CONTRAST MEDICATION: Performed by: NURSE PRACTITIONER

## 2020-01-21 RX ORDER — CETIRIZINE HYDROCHLORIDE 10 MG/1
10 TABLET ORAL DAILY
Qty: 30 TABLET | Refills: 0 | Status: SHIPPED | OUTPATIENT
Start: 2020-01-21 | End: 2020-02-20

## 2020-01-21 RX ORDER — ETODOLAC 400 MG/1
400 TABLET, FILM COATED ORAL 2 TIMES DAILY
Qty: 30 TABLET | Refills: 0 | Status: SHIPPED | OUTPATIENT
Start: 2020-01-21 | End: 2020-02-05

## 2020-01-21 RX ORDER — FLUTICASONE PROPIONATE 50 MCG
1 SPRAY, SUSPENSION (ML) NASAL DAILY
Qty: 1 BOTTLE | Refills: 0 | Status: SHIPPED | OUTPATIENT
Start: 2020-01-21

## 2020-01-21 RX ORDER — KETOROLAC TROMETHAMINE 30 MG/ML
30 INJECTION, SOLUTION INTRAMUSCULAR; INTRAVENOUS ONCE
Status: COMPLETED | OUTPATIENT
Start: 2020-01-21 | End: 2020-01-21

## 2020-01-21 RX ADMIN — IOPAMIDOL 60 ML: 755 INJECTION, SOLUTION INTRAVENOUS at 05:22

## 2020-01-21 RX ADMIN — KETOROLAC TROMETHAMINE 30 MG: 30 INJECTION, SOLUTION INTRAMUSCULAR at 05:32

## 2020-01-21 ASSESSMENT — PAIN DESCRIPTION - LOCATION: LOCATION: EAR

## 2020-01-21 ASSESSMENT — PAIN SCALES - GENERAL
PAINLEVEL_OUTOF10: 10
PAINLEVEL_OUTOF10: 10

## 2020-01-21 ASSESSMENT — PAIN DESCRIPTION - DESCRIPTORS: DESCRIPTORS: CONSTANT;STABBING

## 2020-01-21 ASSESSMENT — PAIN DESCRIPTION - ORIENTATION: ORIENTATION: RIGHT

## 2020-01-21 ASSESSMENT — PAIN DESCRIPTION - PAIN TYPE: TYPE: ACUTE PAIN

## 2020-01-21 NOTE — LETTER
[address] [facility] [address] [address] EMERGENCY DEPT  [address] [place] [address]  Phone: [phone]               January 21, 2020    Patient: Britton Hutchinson   YOB: 2000   Date of Visit: 1/21/2020       To Whom It May Concern:    Carmen Giles was seen and treated in our emergency department on 1/21/2020. She may return to work on 1/23/2020.       Sincerely,       Emergency Department Provider        Signature:__________________________________

## 2020-01-23 ASSESSMENT — ENCOUNTER SYMPTOMS
NAUSEA: 0
VOMITING: 0
EYE REDNESS: 0
COUGH: 0
EYE DISCHARGE: 0

## 2020-01-23 NOTE — ED PROVIDER NOTES
[facility]  Pt Name: Alberta Miller  MRN: [id number]  [place] 2000  Date of evaluation: 1/21/2020  Provider: MATTHIAS Noble CNP    CHIEF COMPLAINT       Chief Complaint   Patient presents with   Hemanth Carrasco     right       Nurses Notes reviewed and I agree except as noted in the HPI. HISTORY OF PRESENT Francisco Sommers is a 22 y.o. female whopresents to the emergency department from home for evaluation of right ear pain. Patient was diagnosed otitis media earlier in the day at urgent care. She was given amoxicillin. Patient states that ibuprofen is not helping the pain and she is having severe pain that radiates up to her temple and down her neck and to the posterior aspect of the ear. Denies fever or other symptoms. Does report some sinus congestion. Ibuprofen has not made his pain better nothing makes the pain worse. Pain is constant. HPI was provided by the patient. Triage notes and Nursing notes were reviewed by myself. Any discrepancies are addressed above. REVIEW OF SYSTEMS     Review of Systems   Constitutional: Positive for fatigue. Negative for fever. HENT: Positive for congestion and ear pain. Negative for ear discharge. Eyes: Negative for discharge and redness. Respiratory: Negative for cough. Gastrointestinal: Negative for nausea and vomiting. Genitourinary: Negative for dysuria. Musculoskeletal: Positive for arthralgias and myalgias. Skin: Negative for rash. Neurological: Positive for headaches. Hematological: Positive for adenopathy. All pertinent systems were reviewed and are negative unless indicated in the HPI. PAST MEDICAL HISTORY    has a past medical history of Rh incompatibility. SURGICAL HISTORY      has no past surgical history on file.     CURRENT MEDICATIONS       Discharge Medication List as of 1/21/2020  6:25 AM      CONTINUE these medications which have NOT CHANGED    Details CNP  01/23/20 1154

## 2020-02-21 NOTE — ED NOTES
Bed: 021A  Expected date:   Expected time:   Means of arrival:   Comments:  Brianna Wynne6, RN  07/11/19 1118 No complaints